# Patient Record
Sex: FEMALE | Race: WHITE | NOT HISPANIC OR LATINO | Employment: OTHER | URBAN - NONMETROPOLITAN AREA
[De-identification: names, ages, dates, MRNs, and addresses within clinical notes are randomized per-mention and may not be internally consistent; named-entity substitution may affect disease eponyms.]

---

## 2023-12-01 ENCOUNTER — TELEPHONE (OUTPATIENT)
Dept: CARDIOLOGY | Facility: CLINIC | Age: 63
End: 2023-12-01
Payer: MEDICARE

## 2023-12-01 PROBLEM — I48.0 PAROXYSMAL ATRIAL FIBRILLATION (MULTI): Status: ACTIVE | Noted: 2023-12-01

## 2023-12-01 PROBLEM — Z79.899 HIGH RISK MEDICATIONS (NOT ANTICOAGULANTS) LONG-TERM USE: Status: ACTIVE | Noted: 2023-12-01

## 2023-12-01 RX ORDER — SERTRALINE HYDROCHLORIDE 100 MG/1
100 TABLET, FILM COATED ORAL DAILY
COMMUNITY

## 2023-12-01 RX ORDER — OMEPRAZOLE 20 MG/1
20 CAPSULE, DELAYED RELEASE ORAL DAILY
COMMUNITY

## 2023-12-01 RX ORDER — DOFETILIDE 0.12 MG/1
250 CAPSULE ORAL 2 TIMES DAILY
COMMUNITY
Start: 2022-12-22 | End: 2024-03-25 | Stop reason: DRUGHIGH

## 2023-12-01 RX ORDER — ELETRIPTAN HYDROBROMIDE 20 MG/1
20 TABLET, FILM COATED ORAL ONCE AS NEEDED
COMMUNITY

## 2023-12-01 RX ORDER — EPINEPHRINE INHALATION 125 UG/1
AEROSOL RESPIRATORY (INHALATION)
COMMUNITY
End: 2024-03-25 | Stop reason: ALTCHOICE

## 2023-12-01 RX ORDER — EPINEPHRINE NASAL SOLUTION 1 MG/ML
SOLUTION NASAL AS NEEDED
COMMUNITY

## 2024-03-25 ENCOUNTER — OFFICE VISIT (OUTPATIENT)
Dept: CARDIOLOGY | Facility: CLINIC | Age: 64
End: 2024-03-25
Payer: MEDICARE

## 2024-03-25 VITALS
BODY MASS INDEX: 43 KG/M2 | HEIGHT: 60 IN | DIASTOLIC BLOOD PRESSURE: 76 MMHG | SYSTOLIC BLOOD PRESSURE: 118 MMHG | HEART RATE: 63 BPM | WEIGHT: 219 LBS

## 2024-03-25 DIAGNOSIS — Z79.899 HIGH RISK MEDICATIONS (NOT ANTICOAGULANTS) LONG-TERM USE: ICD-10-CM

## 2024-03-25 DIAGNOSIS — Z78.9 NEVER SMOKED TOBACCO: ICD-10-CM

## 2024-03-25 DIAGNOSIS — I48.0 PAROXYSMAL ATRIAL FIBRILLATION (MULTI): Primary | ICD-10-CM

## 2024-03-25 PROCEDURE — 1159F MED LIST DOCD IN RCRD: CPT | Performed by: INTERNAL MEDICINE

## 2024-03-25 PROCEDURE — 99213 OFFICE O/P EST LOW 20 MIN: CPT | Performed by: INTERNAL MEDICINE

## 2024-03-25 PROCEDURE — 93000 ELECTROCARDIOGRAM COMPLETE: CPT | Performed by: INTERNAL MEDICINE

## 2024-03-25 PROCEDURE — 3008F BODY MASS INDEX DOCD: CPT | Performed by: INTERNAL MEDICINE

## 2024-03-25 RX ORDER — DOFETILIDE 0.25 MG/1
1 CAPSULE ORAL EVERY 12 HOURS
COMMUNITY
Start: 2024-03-21 | End: 2024-03-25 | Stop reason: SDUPTHER

## 2024-03-25 RX ORDER — AMITRIPTYLINE HYDROCHLORIDE 25 MG/1
25 TABLET, FILM COATED ORAL NIGHTLY
COMMUNITY

## 2024-03-25 RX ORDER — DOFETILIDE 0.25 MG/1
250 CAPSULE ORAL EVERY 12 HOURS
Qty: 180 CAPSULE | Refills: 3 | Status: SHIPPED | OUTPATIENT
Start: 2024-03-25 | End: 2025-03-25

## 2024-03-25 NOTE — LETTER
March 25, 2024     Lamonte Juarez DO  [address] [facility] [address]    Patient: Macrina Mondragon   YOB: 1960   Date of Visit: 3/25/2024       Dear Dr. Lamonte Juarez DO:    Thank you for referring Macrina Mondragon to me for evaluation. Below are my notes for this consultation.  If you have questions, please do not hesitate to call me. I look forward to following your patient along with you.       Sincerely,     Andrade Caba MD      CC: No Recipients  ______________________________________________________________________________________    Subjective   Macrina Mondragon is a 63 y.o. female       Chief Complaint    Follow-up          HPI     Patient returns for follow-up of problems as noted.  She is done well.  I cannot elicit angina CHF or arrhythmia symptomatology.  Sinus rhythm appears to be well-maintained on current therapy and because of this no adjustments appear necessary.  She was educated regarding arrhythmia symptoms watch when she states she has none.  Likewise educated on symptoms of coronary disease and heart failure and she has no such symptomatology.  In light of all the above we believe she is stable and doing well hence no adjustments in therapy appear necessary.  We did advocate the merits of diet and weight loss.          Vitals:    03/25/24 1004   BP: 118/76   BP Location: Right arm   Patient Position: Sitting   Pulse: 63   Weight: 99.3 kg (219 lb)   Height: 1.524 m (5')      EKG done in office today      Objective   Physical Exam  Constitutional:       Appearance: Normal appearance.   HENT:      Nose: Nose normal.   Neck:      Vascular: No carotid bruit.   Cardiovascular:      Rate and Rhythm: Normal rate.      Pulses: Normal pulses.      Heart sounds: Normal heart sounds.   Pulmonary:      Effort: Pulmonary effort is normal.   Abdominal:      General: Bowel sounds are normal.      Palpations: Abdomen is soft.   Musculoskeletal:         General: Normal range  of motion.      Cervical back: Normal range of motion.      Right lower leg: No edema.      Left lower leg: No edema.   Skin:     General: Skin is warm and dry.   Neurological:      General: No focal deficit present.      Mental Status: She is alert.   Psychiatric:         Mood and Affect: Mood normal.         Behavior: Behavior normal.         Thought Content: Thought content normal.         Judgment: Judgment normal.         Allergies  Aspirin and Ibuprofen     Current Medications    Current Outpatient Medications:   •  amitriptyline (Elavil) 25 mg tablet, Take 1 tablet (25 mg) by mouth once daily at bedtime., Disp: , Rfl:   •  dofetilide (Tikosyn) 250 mcg capsule, 1 capsule (250 mcg) every 12 hours., Disp: , Rfl:   •  eletriptan (Relpax) 20 mg tablet, Take 1 tablet (20 mg) by mouth 1 time if needed for migraine. May repeat in 2 hours if unresolved. Do not exceed 80 mg in 24 hours., Disp: , Rfl:   •  EPINEPHrine (Adrenalin) 1 mg/mL nasal solution, Administer into affected nostril(s) if needed., Disp: , Rfl:   •  omeprazole (PriLOSEC) 20 mg DR capsule, Take 1 capsule (20 mg) by mouth once daily., Disp: , Rfl:   •  sertraline (Zoloft) 100 mg tablet, Take 1 tablet (100 mg) by mouth once daily., Disp: , Rfl:                      Assessment/Plan   1. Paroxysmal atrial fibrillation (CMS/HCC)  No recurrence of arrhythmia on current dofetilide therapy.    2. High risk medications (not anticoagulants) long-term use  EKG and QTc measurements are satisfactory.    3. BMI 40.0-44.9, adult (CMS/HCC)  The merits of weight loss were advocated    4. Never smoked tobacco  Noted        Scribe Attestation  By signing my name below, I, Adolph King LPN   attest that this documentation has been prepared under the direction and in the presence of Andrade Caba MD.     Provider Attestation - Scribe documentation    All medical record entries made by the Scribe were at my direction and personally dictated by me. I have reviewed  the chart and agree that the record accurately reflects my personal performance of the history, physical exam, discussion and plan.

## 2024-03-25 NOTE — PATIENT INSTRUCTIONS
Current weight: 99.3 kg (219 lb)  Weight change since last visit (-) denotes wt loss 12 lbs   Weight loss needed to achieve BMI 25: 91.3 Lbs  Weight loss needed to achieve BMI 30: 65.7 Lbs  Please bring all medicines, vitamins, and herbal supplements with you when you come to the office.    Prescriptions will not be filled unless you are compliant with your follow up appointments or have a follow up appointment scheduled as per instruction of your physician. Refills should be requested at the time of your visit.

## 2024-03-25 NOTE — PROGRESS NOTES
Subjective   Macrina Mondragon is a 63 y.o. female       Chief Complaint    Follow-up          HPI     Patient returns for follow-up of problems as noted.  She is done well.  I cannot elicit angina CHF or arrhythmia symptomatology.  Sinus rhythm appears to be well-maintained on current therapy and because of this no adjustments appear necessary.  She was educated regarding arrhythmia symptoms watch when she states she has none.  Likewise educated on symptoms of coronary disease and heart failure and she has no such symptomatology.  In light of all the above we believe she is stable and doing well hence no adjustments in therapy appear necessary.  We did advocate the merits of diet and weight loss.          Vitals:    03/25/24 1004   BP: 118/76   BP Location: Right arm   Patient Position: Sitting   Pulse: 63   Weight: 99.3 kg (219 lb)   Height: 1.524 m (5')      EKG done in office today      Objective   Physical Exam  Constitutional:       Appearance: Normal appearance.   HENT:      Nose: Nose normal.   Neck:      Vascular: No carotid bruit.   Cardiovascular:      Rate and Rhythm: Normal rate.      Pulses: Normal pulses.      Heart sounds: Normal heart sounds.   Pulmonary:      Effort: Pulmonary effort is normal.   Abdominal:      General: Bowel sounds are normal.      Palpations: Abdomen is soft.   Musculoskeletal:         General: Normal range of motion.      Cervical back: Normal range of motion.      Right lower leg: No edema.      Left lower leg: No edema.   Skin:     General: Skin is warm and dry.   Neurological:      General: No focal deficit present.      Mental Status: She is alert.   Psychiatric:         Mood and Affect: Mood normal.         Behavior: Behavior normal.         Thought Content: Thought content normal.         Judgment: Judgment normal.         Allergies  Aspirin and Ibuprofen     Current Medications    Current Outpatient Medications:     amitriptyline (Elavil) 25 mg tablet, Take 1 tablet (25  mg) by mouth once daily at bedtime., Disp: , Rfl:     dofetilide (Tikosyn) 250 mcg capsule, 1 capsule (250 mcg) every 12 hours., Disp: , Rfl:     eletriptan (Relpax) 20 mg tablet, Take 1 tablet (20 mg) by mouth 1 time if needed for migraine. May repeat in 2 hours if unresolved. Do not exceed 80 mg in 24 hours., Disp: , Rfl:     EPINEPHrine (Adrenalin) 1 mg/mL nasal solution, Administer into affected nostril(s) if needed., Disp: , Rfl:     omeprazole (PriLOSEC) 20 mg DR capsule, Take 1 capsule (20 mg) by mouth once daily., Disp: , Rfl:     sertraline (Zoloft) 100 mg tablet, Take 1 tablet (100 mg) by mouth once daily., Disp: , Rfl:                      Assessment/Plan   1. Paroxysmal atrial fibrillation (CMS/HCC)  No recurrence of arrhythmia on current dofetilide therapy.    2. High risk medications (not anticoagulants) long-term use  EKG and QTc measurements are satisfactory.    3. BMI 40.0-44.9, adult (CMS/HCC)  The merits of weight loss were advocated    4. Never smoked tobacco  Noted        Scribe Attestation  By signing my name below, I, Adolph King LPN   attest that this documentation has been prepared under the direction and in the presence of Andrade Caba MD.     Provider Attestation - Scribe documentation    All medical record entries made by the Scribe were at my direction and personally dictated by me. I have reviewed the chart and agree that the record accurately reflects my personal performance of the history, physical exam, discussion and plan.

## 2024-04-23 ENCOUNTER — APPOINTMENT (OUTPATIENT)
Dept: CARDIOLOGY | Facility: CLINIC | Age: 64
End: 2024-04-23
Payer: MEDICARE

## 2025-03-26 ENCOUNTER — APPOINTMENT (OUTPATIENT)
Dept: CARDIOLOGY | Facility: CLINIC | Age: 65
End: 2025-03-26
Payer: MEDICARE

## 2025-03-26 VITALS
HEART RATE: 65 BPM | WEIGHT: 222.2 LBS | DIASTOLIC BLOOD PRESSURE: 86 MMHG | BODY MASS INDEX: 44.8 KG/M2 | HEIGHT: 59 IN | SYSTOLIC BLOOD PRESSURE: 132 MMHG

## 2025-03-26 DIAGNOSIS — I48.0 PAROXYSMAL ATRIAL FIBRILLATION (MULTI): ICD-10-CM

## 2025-03-26 DIAGNOSIS — Z79.899 HIGH RISK MEDICATIONS (NOT ANTICOAGULANTS) LONG-TERM USE: Primary | ICD-10-CM

## 2025-03-26 PROCEDURE — 3008F BODY MASS INDEX DOCD: CPT | Performed by: NURSE PRACTITIONER

## 2025-03-26 PROCEDURE — 99214 OFFICE O/P EST MOD 30 MIN: CPT | Performed by: NURSE PRACTITIONER

## 2025-03-26 PROCEDURE — 93000 ELECTROCARDIOGRAM COMPLETE: CPT | Performed by: NURSE PRACTITIONER

## 2025-03-26 PROCEDURE — 1159F MED LIST DOCD IN RCRD: CPT | Performed by: NURSE PRACTITIONER

## 2025-03-26 PROCEDURE — 1160F RVW MEDS BY RX/DR IN RCRD: CPT | Performed by: NURSE PRACTITIONER

## 2025-03-26 NOTE — LETTER
"2025     Lamonte Juarez DO  [address] [facility] [address]    Patient: Macrina Mondragon   YOB: 1960   Date of Visit: 3/26/2025       Dear Dr. Lamonte Juarez DO:    Thank you for referring Macrina Mondragon to me for evaluation. Below are my notes for this consultation.  If you have questions, please do not hesitate to call me. I look forward to following your patient along with you.       Sincerely,     Shilpi Martínez, APRN-CNP      CC: No Recipients  ______________________________________________________________________________________    Chief Complaint  \" Doing good for routine follow-up\"    Reason for Visit  Annual follow-up  Patient presents to the office today for outpatient follow-up for A-fib and high risk med use.  Last evaluated in clinic by Dr. Mac 2024.    Presents today ambulatory with steady gait.  Accompanied by patient  Patient denies any hospitalizations or significant changes to interval medical history since last office follow-up.   She follows routinely with PCP.  Reports labs 2024 [facility] and will need to obtain.    History of Present Illness   Patient is an extremely pleasant 64-year-old female who presents to the office today with no voiced cardiovascular complaints.  She remains aerobically active and walks her dog on a daily basis, does housework and completes ADLs.  Goes up and down stairs on a regular basis and is able to walk through the grocery store.  She denies any type of exertional complaints.  She denies any recurrent palpitations.    In the past, Dr. Mac had opted against anticoagulation due to low [id number] score.  Due to age and female sex CHADS VASc 2 - reviewed her 2-4% risk of CVA.  She is reluctant to start anticoagulation due to excessive bruisability, history of gastric bypass.    Lifestyle modifications for PAF reviewed includin.  Limit alcohol intake: none  2.  Limit caffeine intake: none  3.  " "Discussed importance of optimal weight control and impact on PAF.  Encourage to obtain optimal BMI.  4.  Obstructive Sleep Apnea: compliant with CPAP    Fall screening reveals an occurrence over the course of the last year. Events were reviewed in detail with the patient and due to accidental.    Patient reports that overall has no complaint(s) of chest pain, chest pressure/discomfort, claudication, dyspnea, exertional chest pressure/discomfort, fatigue, irregular heart beat, and lower extremity edema    Daily activity:    Greater than 4 METS  Denies any change in exercise capacity or functional tolerance since last office visit.    The importance of primary prevention reviewed:  HTN: None  HLD: No treatment, last lipids from May 2024 ACC/AHA risk score 5.4%  DM: Denies  Smoker: Denies  BMI:  Reviewed the merits of healthy lifestyle choices on overall cardiovascular health.      Review of Systems   Cardiovascular:  Negative for chest pain, dyspnea on exertion, irregular heartbeat, leg swelling, near-syncope, orthopnea, palpitations, paroxysmal nocturnal dyspnea and syncope.        Visit Vitals  /86 (BP Location: Left arm, Patient Position: Sitting)   Pulse 65   Ht 1.499 m (4' 11\")   Wt 101 kg (222 lb 3.2 oz)   BMI 44.88 kg/m²   Smoking Status Never   BSA 2.05 m²     Physical Exam  Vitals and nursing note reviewed.   HENT:      Head: Normocephalic.   Cardiovascular:      Rate and Rhythm: Normal rate and regular rhythm.      Heart sounds: Normal heart sounds.   Pulmonary:      Effort: Pulmonary effort is normal.      Breath sounds: Normal breath sounds.   Abdominal:      Palpations: Abdomen is soft.   Musculoskeletal:      Right lower leg: No edema.      Left lower leg: No edema.   Skin:     General: Skin is warm and dry.   Neurological:      General: No focal deficit present.      Mental Status: She is alert.   Psychiatric:         Mood and Affect: Mood normal.         Behavior: Behavior normal.      "   Allergies   Allergen Reactions   • Aspirin Other   • Ibuprofen Nausea/vomiting     Current Outpatient Medications   Medication Instructions   • amitriptyline (ELAVIL) 25 mg, Nightly   • dofetilide (TIKOSYN) 250 mcg, oral, Every 12 hours   • eletriptan (RELPAX) 20 mg, Once as needed   • EPINEPHrine (Adrenalin) 1 mg/mL nasal solution As needed   • omeprazole (PRILOSEC) 20 mg, Daily   • sertraline (ZOLOFT) 100 mg, Daily      Assessment:    High risk medications (not anticoagulants) long-term use  Dofetilide start date December 2022  EKG in office normal sinus rhythm, QTc 420.  Last creatinine 0.77    Paroxysmal atrial fibrillation (Multi)  Diagnosed December 2022 hospitalization.  She was initiated on dofetilide and has been maintaining normal sinus rhythm since that time.  Denies any recurrent palpitations.    CHADS VASc 2 in the past has opted against anticoagulation due to excessive bruisability.  Also declines aspirin 81 mg due to history of gastric bypass.    Cardiac and Vasculature  December 2022 cardiac cath with angiographically normal coronaries    December 2022 TTE LVEF 55 to 60%, no structural abnormalities.    BMI 40.0-44.9, adult (Multi)  Reviewed the merits of healthy lifestyle choices on overall cardiovascular health.      Plan:     Through informed decision making process incorporating patients unique circumstances, the following treatment plan will be initiated:    1.  Prescription drug management of cardiovascular medication for efficacy, adherence to treatment, side effect assessment and polypharmacy. Current treatment clinically warranted and to continue without modifications.    2.  Return for follow-up; in the interim, contact the office if new symptoms arise.  NP 6 months     3.  Labs (chem6) with next lab draw    Shilpi Martínez MSN, APRN-CNP, PMHNP-[facility] [place]  [place], [place]     Please excuse any errors in grammar or translation related to  this dictation. Voice recognition software was utilized to prepare this document.

## 2025-03-26 NOTE — PROGRESS NOTES
"Chief Complaint  \" Doing good for routine follow-up\"    Reason for Visit  Annual follow-up  Patient presents to the office today for outpatient follow-up for A-fib and high risk med use.  Last evaluated in clinic by Dr. Mac 2024.    Presents today ambulatory with steady gait.  Accompanied by patient  Patient denies any hospitalizations or significant changes to interval medical history since last office follow-up.   She follows routinely with PCP.  Reports labs 2024 [facility] and will need to obtain.    History of Present Illness   Patient is an extremely pleasant 64-year-old female who presents to the office today with no voiced cardiovascular complaints.  She remains aerobically active and walks her dog on a daily basis, does housework and completes ADLs.  Goes up and down stairs on a regular basis and is able to walk through the grocery store.  She denies any type of exertional complaints.  She denies any recurrent palpitations.    In the past, Dr. Mac had opted against anticoagulation due to low [id number] score.  Due to age and female sex CHADS VASc 2 - reviewed her 2-4% risk of CVA.  She is reluctant to start anticoagulation due to excessive bruisability, history of gastric bypass.    Lifestyle modifications for PAF reviewed includin.  Limit alcohol intake: none  2.  Limit caffeine intake: none  3.  Discussed importance of optimal weight control and impact on PAF.  Encourage to obtain optimal BMI.  4.  Obstructive Sleep Apnea: compliant with CPAP    Fall screening reveals an occurrence over the course of the last year. Events were reviewed in detail with the patient and due to accidental.    Patient reports that overall has no complaint(s) of chest pain, chest pressure/discomfort, claudication, dyspnea, exertional chest pressure/discomfort, fatigue, irregular heart beat, and lower extremity edema    Daily activity:    Greater than 4 METS  Denies any change in exercise capacity or " "functional tolerance since last office visit.    The importance of primary prevention reviewed:  HTN: None  HLD: No treatment, last lipids from May 2024 ACC/AHA risk score 5.4%  DM: Denies  Smoker: Denies  BMI:  Reviewed the merits of healthy lifestyle choices on overall cardiovascular health.      Review of Systems   Cardiovascular:  Negative for chest pain, dyspnea on exertion, irregular heartbeat, leg swelling, near-syncope, orthopnea, palpitations, paroxysmal nocturnal dyspnea and syncope.        Visit Vitals  /86 (BP Location: Left arm, Patient Position: Sitting)   Pulse 65   Ht 1.499 m (4' 11\")   Wt 101 kg (222 lb 3.2 oz)   BMI 44.88 kg/m²   Smoking Status Never   BSA 2.05 m²     Physical Exam  Vitals and nursing note reviewed.   HENT:      Head: Normocephalic.   Cardiovascular:      Rate and Rhythm: Normal rate and regular rhythm.      Heart sounds: Normal heart sounds.   Pulmonary:      Effort: Pulmonary effort is normal.      Breath sounds: Normal breath sounds.   Abdominal:      Palpations: Abdomen is soft.   Musculoskeletal:      Right lower leg: No edema.      Left lower leg: No edema.   Skin:     General: Skin is warm and dry.   Neurological:      General: No focal deficit present.      Mental Status: She is alert.   Psychiatric:         Mood and Affect: Mood normal.         Behavior: Behavior normal.        Allergies   Allergen Reactions    Aspirin Other    Ibuprofen Nausea/vomiting     Current Outpatient Medications   Medication Instructions    amitriptyline (ELAVIL) 25 mg, Nightly    dofetilide (TIKOSYN) 250 mcg, oral, Every 12 hours    eletriptan (RELPAX) 20 mg, Once as needed    EPINEPHrine (Adrenalin) 1 mg/mL nasal solution As needed    omeprazole (PRILOSEC) 20 mg, Daily    sertraline (ZOLOFT) 100 mg, Daily      Assessment:    High risk medications (not anticoagulants) long-term use  Dofetilide start date December 2022  EKG in office normal sinus rhythm, QTc 420.  Last creatinine " 0.77    Paroxysmal atrial fibrillation (Multi)  Diagnosed December 2022 hospitalization.  She was initiated on dofetilide and has been maintaining normal sinus rhythm since that time.  Denies any recurrent palpitations.    CHADS VASc 2 in the past has opted against anticoagulation due to excessive bruisability.  Also declines aspirin 81 mg due to history of gastric bypass.    Cardiac and Vasculature  December 2022 cardiac cath with angiographically normal coronaries    December 2022 TTE LVEF 55 to 60%, no structural abnormalities.    BMI 40.0-44.9, adult (Multi)  Reviewed the merits of healthy lifestyle choices on overall cardiovascular health.      Plan:     Through informed decision making process incorporating patients unique circumstances, the following treatment plan will be initiated:    1.  Prescription drug management of cardiovascular medication for efficacy, adherence to treatment, side effect assessment and polypharmacy. Current treatment clinically warranted and to continue without modifications.    2.  Return for follow-up; in the interim, contact the office if new symptoms arise.  NP 6 months     3.  Labs (chem6) with next lab draw    Shilpi Martínez MSN, APRN-CNP, PMHNP-BC  [facility] [place]  [place], [place]     Please excuse any errors in grammar or translation related to this dictation. Voice recognition software was utilized to prepare this document.

## 2025-03-26 NOTE — PATIENT INSTRUCTIONS
Please bring all medicines, vitamins, and herbal supplements with you when you come to the office.    Prescriptions will not be filled unless you are compliant with your follow up appointments or have a follow up appointment scheduled as per instruction of your physician. Refills should be requested at the time of your visit.     Fall Prevention Education Given     PLAN:   Through informed decision making process incorporating patients unique circumstances, the following treatment plan will be initiated:    1.  Prescription drug management of cardiovascular medication for efficacy, adherence to treatment, side effect assessment and polypharmacy. Current treatment clinically warranted and to continue without modifications.    2.  Return for follow-up; in the interim, contact the office if new symptoms arise.  NP 6 months     3.  Labs (chem6) with next lab draw        Ways to Help Prevent Falls at Home    Quick Tips   ? Ask for help if you need it. Most people want to help!   ? Get up slowly after sitting or laying down   ? Wear a medical alert device or keep cell phone in your pocket   ? Use night lights, especially areas near a bathroom   ? Keep the items you use often within reach on a small stool or end table   ? Use an assistive device such as walker or cane, as directed by provider/physical therapy   ? Use a non-slip mat and grab bars in your bathroom. Look for home health sections for best options     Other Areas to Focus On   ? Exercise and nutrition: Regular exercise or taking a falls prevention class are great ways improve strength and balance. Don’t forget to stay hydrated and bring a snack!   ? Medicine side effects: Some medicines can make you sleepy or dizzy, which could cause a fall. Ask your healthcare provider about the side effects your medicines could cause. Be sure to let them know if you take any vitamins or supplements as well.   ? Tripping hazards: Remove items you could trip on, such as loose  mats, rugs, cords, and clutter. Wear closed toe shoes with rubber soles.   ? Health and wellness: Get regular checkups with your healthcare provider, plus routine vision and hearing screenings. Talk with your healthcare provider about:   o Your medicines and the possible side effects - bring them in a bag if that is easier!   o Problems with balance or feeling dizzy   o Ways to promote bone health, such as Vitamin D and calcium supplements   o Questions or concerns about falling     *Ask your healthcare team if you have questions     ©[facility], 2022

## 2025-03-27 ASSESSMENT — ENCOUNTER SYMPTOMS
ORTHOPNEA: 0
NEAR-SYNCOPE: 0
PND: 0
DYSPNEA ON EXERTION: 0
PALPITATIONS: 0
SYNCOPE: 0
IRREGULAR HEARTBEAT: 0

## 2025-03-27 NOTE — ASSESSMENT & PLAN NOTE
Diagnosed December 2022 hospitalization.  She was initiated on dofetilide and has been maintaining normal sinus rhythm since that time.  Denies any recurrent palpitations.    CHADS VASc 2 in the past has opted against anticoagulation due to excessive bruisability.  Also declines aspirin 81 mg due to history of gastric bypass.

## 2025-03-27 NOTE — ASSESSMENT & PLAN NOTE
Dofetilide start date December 2022  EKG in office normal sinus rhythm, QTc 420.  Last creatinine 0.77

## 2025-03-27 NOTE — ASSESSMENT & PLAN NOTE
December 2022 cardiac cath with angiographically normal coronaries    December 2022 TTE LVEF 55 to 60%, no structural abnormalities.

## 2025-05-08 DIAGNOSIS — I48.0 PAROXYSMAL ATRIAL FIBRILLATION (MULTI): ICD-10-CM

## 2025-05-08 RX ORDER — DOFETILIDE 0.25 MG/1
250 CAPSULE ORAL EVERY 12 HOURS
Qty: 180 CAPSULE | Refills: 3 | Status: SHIPPED | OUTPATIENT
Start: 2025-05-08 | End: 2026-05-08

## 2025-09-29 ENCOUNTER — APPOINTMENT (OUTPATIENT)
Dept: CARDIOLOGY | Facility: CLINIC | Age: 65
End: 2025-09-29
Payer: MEDICARE